# Patient Record
Sex: FEMALE | Race: WHITE | NOT HISPANIC OR LATINO | URBAN - METROPOLITAN AREA
[De-identification: names, ages, dates, MRNs, and addresses within clinical notes are randomized per-mention and may not be internally consistent; named-entity substitution may affect disease eponyms.]

---
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
## 2022-02-15 PROBLEM — Z00.00 ENCOUNTER FOR PREVENTIVE HEALTH EXAMINATION: Noted: 2022-02-15

## 2022-02-16 PROBLEM — Z00.00 ENCOUNTER FOR PREVENTIVE HEALTH EXAMINATION: Status: ACTIVE | Noted: 2022-02-16

## 2022-02-28 ENCOUNTER — APPOINTMENT (OUTPATIENT)
Dept: INFECTIOUS DISEASE | Facility: CLINIC | Age: 22
End: 2022-02-28
Payer: COMMERCIAL

## 2022-02-28 VITALS
OXYGEN SATURATION: 99 % | HEIGHT: 62 IN | SYSTOLIC BLOOD PRESSURE: 104 MMHG | WEIGHT: 157 LBS | RESPIRATION RATE: 15 BRPM | BODY MASS INDEX: 28.89 KG/M2 | DIASTOLIC BLOOD PRESSURE: 68 MMHG | TEMPERATURE: 97.8 F | HEART RATE: 65 BPM

## 2022-02-28 DIAGNOSIS — R89.9 UNSPECIFIED ABNORMAL FINDING IN SPECIMENS FROM OTHER ORGANS, SYSTEMS AND TISSUES: ICD-10-CM

## 2022-02-28 DIAGNOSIS — Z86.19 PERSONAL HISTORY OF OTHER INFECTIOUS AND PARASITIC DISEASES: ICD-10-CM

## 2022-02-28 PROCEDURE — 99203 OFFICE O/P NEW LOW 30 MIN: CPT

## 2022-02-28 RX ORDER — ECONAZOLE NITRATE 10 MG/G
1 CREAM TOPICAL
Qty: 30 | Refills: 0 | Status: ACTIVE | COMMUNITY
Start: 2022-02-08

## 2022-02-28 NOTE — ASSESSMENT
[FreeTextEntry1] : 21 y/o woman treated for Lyme disease in Spring 2021 with 3 months of doxycycline followed by 14 days of rocephin with persistently positive IgG and continued knee swelling.  I suspect if her knee swelling was due to Lyme she would have completed an adequate course of abx.  IgG can remain elevated for many months and her IgM and PCR are negative suggesting a prior Lyme infection\par \par Rec:\par 1. Cont supportive care off abx\par 2. Pt is awaiting ortho evaluation\par \par Return as needed.

## 2022-02-28 NOTE — HISTORY OF PRESENT ILLNESS
[FreeTextEntry1] : Pt is a 21 y/o otherwise healthy female who presents with positive lyme serology and left knee swelling.  She states that she was initially diagnosed with Lyme in spring, 2021 treated at that time with 3 months of doxycycline followed by 4 weeks of IV rocephin.  Her knee was swollen and did not improve with treatment.  Pt had bloodwork repeated rcently and IgG was positive, IgM and PCR for lyme were negative.  She was sent to me for evaluation of these findings. She complains of fatigue but is otherwise asymptomatic.  She is scheduled to see ortho to have arthoscopy.

## 2022-02-28 NOTE — REASON FOR VISIT
[Other: _____] : [unfilled] [Consultation] : a consultation visit [FreeTextEntry1] : New pt referred by Pediatrician, Dr. Crow, for Lyme disease\par currently taking antibiotics for acne, unsure of the name\par pt currently c/o left knee swelling, and overall fatigue\par h/o lyme disease - has been tx with IVAB Ceftriaxone 6/4/21-6/24/21, states she noticed much improvement with iv tx in the past\par was also tx with 6 wks of PO doxycycline sping 2021\par has upcoming apt with Ortho on Wednesday

## 2022-02-28 NOTE — PHYSICAL EXAM
[General Appearance - Alert] : alert [General Appearance - In No Acute Distress] : in no acute distress [Sclera] : the sclera and conjunctiva were normal [PERRL With Normal Accommodation] : pupils were equal in size, round, reactive to light [Extraocular Movements] : extraocular movements were intact [Outer Ear] : the ears and nose were normal in appearance [Oropharynx] : the oropharynx was normal with no thrush [Neck Appearance] : the appearance of the neck was normal [Neck Cervical Mass (___cm)] : no neck mass was observed [Jugular Venous Distention Increased] : there was no jugular-venous distention [Thyroid Diffuse Enlargement] : the thyroid was not enlarged [Auscultation Breath Sounds / Voice Sounds] : lungs were clear to auscultation bilaterally [Heart Rate And Rhythm] : heart rate was normal and rhythm regular [Heart Sounds] : normal S1 and S2 [Heart Sounds Gallop] : no gallops [Murmurs] : no murmurs [Heart Sounds Pericardial Friction Rub] : no pericardial rub [Full Pulse] : the pedal pulses are present [Edema] : there was no peripheral edema [Bowel Sounds] : normal bowel sounds [Abdomen Soft] : soft [Abdomen Tenderness] : non-tender [Abdomen Mass (___ Cm)] : no abdominal mass palpated [Costovertebral Angle Tenderness] : no CVA tenderness [No Palpable Adenopathy] : no palpable adenopathy [FreeTextEntry1] : minimal swelling left knee [Skin Color & Pigmentation] : normal skin color and pigmentation [] : no rash [Deep Tendon Reflexes (DTR)] : deep tendon reflexes were 2+ and symmetric [Sensation] : the sensory exam was normal to light touch and pinprick [No Focal Deficits] : no focal deficits

## 2022-04-01 ENCOUNTER — RESULT REVIEW (OUTPATIENT)
Age: 22
End: 2022-04-01

## 2022-04-05 ENCOUNTER — FORM ENCOUNTER (OUTPATIENT)
Age: 22
End: 2022-04-05

## 2022-04-11 ENCOUNTER — APPOINTMENT (OUTPATIENT)
Dept: ORTHOPEDIC SURGERY | Facility: CLINIC | Age: 22
End: 2022-04-11
Payer: COMMERCIAL

## 2022-04-11 VITALS — BODY MASS INDEX: 32.57 KG/M2 | HEIGHT: 62 IN | WEIGHT: 177 LBS

## 2022-04-11 DIAGNOSIS — Z78.9 OTHER SPECIFIED HEALTH STATUS: ICD-10-CM

## 2022-04-11 DIAGNOSIS — Z87.09 PERSONAL HISTORY OF OTHER DISEASES OF THE RESPIRATORY SYSTEM: ICD-10-CM

## 2022-04-11 DIAGNOSIS — M65.9 SYNOVITIS AND TENOSYNOVITIS, UNSPECIFIED: ICD-10-CM

## 2022-04-11 PROCEDURE — 99024 POSTOP FOLLOW-UP VISIT: CPT

## 2022-04-11 RX ORDER — CEPHALEXIN 500 MG/1
500 CAPSULE ORAL
Qty: 60 | Refills: 0 | Status: DISCONTINUED | COMMUNITY
Start: 2022-02-14 | End: 2022-04-11

## 2022-04-11 NOTE — PHYSICAL EXAM
[Left] : left knee [5___] : hamstring 5[unfilled]/5 [] : negative Valgus instability [[id number]] : flexion 135 degrees [de-identified] : extension 0 degrees

## 2022-04-11 NOTE — HISTORY OF PRESENT ILLNESS
[de-identified] : following up for the left knee. Patient is s/p L knee scope 4/01/2022.  Patient notes the knee pain has improved since surgery. She denies any fever chills or drainage from the knee.  She is WBAT LLE.

## 2022-04-11 NOTE — PHYSICAL EXAM
[Left] : left knee [5___] : hamstring 5[unfilled]/5 [] : negative Valgus instability [[id number]] : flexion 135 degrees [de-identified] : extension 0 degrees

## 2022-04-11 NOTE — HISTORY OF PRESENT ILLNESS
[de-identified] : following up for the left knee. Patient is s/p L knee scope 4/01/2022.  Patient notes the knee pain has improved since surgery. She denies any fever chills or drainage from the knee.  She is WBAT LLE.

## 2022-05-23 ENCOUNTER — APPOINTMENT (OUTPATIENT)
Dept: ORTHOPEDIC SURGERY | Facility: CLINIC | Age: 22
End: 2022-05-23

## 2022-07-27 ENCOUNTER — NON-APPOINTMENT (OUTPATIENT)
Age: 22
End: 2022-07-27